# Patient Record
(demographics unavailable — no encounter records)

---

## 2025-04-02 NOTE — HISTORY OF PRESENT ILLNESS
[FreeTextEntry1] : 04/02/2025. JEREMIAS PRINGLE 28 year old female G0 LMP 3/10/25 presents for annual gyn exam.  She feels well and offers no complaints. She reports monthly menses, not too heavy, not painful. She denies intermenstrual bleeding. No vaginal discharge or vaginitis symptoms. No urinary complaints. BM is normal per patient. She denies abdominal and pelvic pain. She occasionally gets hot flashes before the period or on the heaviest day of period.  Pt currently single and concerned for fertility options in the future.  Pt is sexually active. She uses pills as birth control.  GynHx: abn pap (2017), hx HPV pos (2017) PMH: acne SHx: sinus surgery (June 2019) FHx: Denies FHx of breast, ovarian, uterine or colon cancer. Meds: Viorele, spironolactone Social: marijuana, occasional EtOH All: walnuts, pecans, hazelnuts   Preventative Visit:   PAP Smear: 3/2024 NILM

## 2025-04-02 NOTE — PLAN
[FreeTextEntry1] : 29 year old female presents for routine gyn exam  BSE taught Breast and pelvic exam performed Pap conducted  STI screening ordered  Fertility testing [facility] ordered  referred to [facility] for consultation for egg freezing  Contraception continue OCPs - Viorele  RTO in 1 year or PRN